# Patient Record
(demographics unavailable — no encounter records)

---

## 2025-01-15 NOTE — ASSESSMENT
[FreeTextEntry1] : The patient is a 49-year-old male with morbid obesity who is status post right leg endovenous laser ablation of his greater saphenous vein. The patient tolerated the procedure well. Today he presents with new bilateral lower extremity venous stasis ulcers.  Recommend Unna boot therapy bilaterally Follow-up in 1 week

## 2025-01-15 NOTE — HISTORY OF PRESENT ILLNESS
[FreeTextEntry1] : The patient is s right EVLT of his GSV.  the patient presents today for follow-up of his bilateral venous stasis ulcer.  The patient hasnt been seen in the last 2-1/2 years.

## 2025-01-15 NOTE — REASON FOR VISIT
[Follow-Up: _____] : a [unfilled] follow-up visit [FreeTextEntry1] : varicose veins s/p right EVLT of the GSV new venous stasis ulcer

## 2025-01-15 NOTE — PHYSICAL EXAM
[Ankle Swelling (On Exam)] : present [Varicose Veins Of Lower Extremities] : bilaterally [Ankle Swelling On The Left] : moderate [] : bilaterally [Ankle Swelling Bilaterally] : severe [FreeTextEntry1] : Right anterior tibial stasis ulcer 1 cm anterior tibial posterior calf 2 cm.  Left anterior tibial ulcer 2 cm circumferential no evidence of cellulitis

## 2025-01-22 NOTE — PHYSICAL EXAM
[Ankle Swelling (On Exam)] : present [Varicose Veins Of Lower Extremities] : bilaterally [Ankle Swelling On The Left] : moderate [] : bilaterally [Ankle Swelling Bilaterally] : severe [FreeTextEntry1] : Right anterior tibial stasis ulcer 1 cm anterior tibial posterior calf 2 cm.  Left anterior tibial ulcer 2 cm circumferential no evidence of cellulitis no

## 2025-02-10 NOTE — ASSESSMENT
[FreeTextEntry1] : The patient is a 49-year-old male with morbid obesity who is status post right leg endovenous laser ablation of his greater saphenous vein. The patient tolerated the procedure well.  The patient has been receiving bilateral Unna boot therapy for venous stasis ulcers.  The patient's ulcers are improving however he will continue to require bilateral Unna boot therapy.  I will see him back in 1 week's time.

## 2025-02-10 NOTE — PHYSICAL EXAM
[Ankle Swelling (On Exam)] : present [Varicose Veins Of Lower Extremities] : bilaterally [Ankle Swelling On The Left] : moderate [] : bilaterally [Ankle Swelling Bilaterally] : severe [FreeTextEntry1] : Right anterior tibial stasis ulcer 1 cm anterior tibial posterior calf 1 cm.  Left anterior tibial ulcer 1 cm circumferential no evidence of cellulitis

## 2025-02-10 NOTE — REVIEW OF SYSTEMS
[Negative] : Heme/Lymph Modified Advancement Flap Text: The defect edges were debeveled with a #15 scalpel blade.  Given the location of the defect, shape of the defect and the proximity to free margins a modified advancement flap was deemed most appropriate.  Using a sterile surgical marker, an appropriate advancement flap was drawn incorporating the defect and placing the expected incisions within the relaxed skin tension lines where possible.    The area thus outlined was incised deep to adipose tissue with a #15 scalpel blade.  The skin margins were undermined to an appropriate distance in all directions utilizing iris scissors.

## 2025-02-25 NOTE — REASON FOR VISIT
[Follow-Up: _____] : a [unfilled] follow-up visit [FreeTextEntry1] : Bilateral lower extremity venous stasis ulcers

## 2025-02-25 NOTE — HISTORY OF PRESENT ILLNESS
[FreeTextEntry1] : The patient is s right EVLT of his GSV.  the patient presents today for follow-up of his bilateral venous stasis ulcer.  The patient hasnt been seen in the last 2-1/2 years.  The patient has been receiving bilateral Unna boot therapy.  His ulcers are overall improving.

## 2025-03-26 NOTE — ASSESSMENT
[FreeTextEntry1] : The patient is a 49-year-old male with morbid obesity who is status post right leg endovenous laser ablation of his greater saphenous vein. The patient tolerated the procedure well.  The patient has been receiving bilateral Unna boot therapy for venous stasis ulcers.  The patient's left leg ulcer is completely healed.  The patient will benefit from 1 more week of right leg Unna boot therapy.  I recommend clobetasol cream to his left lower extremity daily to treat his topical dermatitis.  Follow-up in 1 week

## 2025-03-26 NOTE — PHYSICAL EXAM
[Ankle Swelling (On Exam)] : present [Varicose Veins Of Lower Extremities] : bilaterally [Ankle Swelling On The Left] : moderate [] : present [Ankle Swelling Bilaterally] : severe [FreeTextEntry1] : Right anterior tibial stasis ulcer 1 cm anterior tibial posterior calf 1 cm.  Left anterior tibial ulcer 1 cm circumferential no evidence of cellulitis

## 2025-04-02 NOTE — ASSESSMENT
[FreeTextEntry1] : The patient is a 49-year-old male with morbid obesity who is status post right leg endovenous laser ablation of his greater saphenous vein. The patient tolerated the procedure well.  The patient has been receiving bilateral Unna boot therapy for venous stasis ulcers.  The patient now has recurrent bilateral lower extremity venous stasis ulcers.  I recommend continuing with Unna boot therapy once again and I will see him back in my office in 1 week's time

## 2025-04-02 NOTE — HISTORY OF PRESENT ILLNESS
[FreeTextEntry1] : The patient is s right EVLT of his GSV.  the patient presents today for follow-up of his bilateral venous stasis ulcer.  The patient hasn't been seen in the last 2-1/2 years.   The patient has been receiving bilateral Unna boot therapy.  His venous stasis ulcers had healed and now he developed a new venous stasis ulcers.

## 2025-04-28 NOTE — HISTORY OF PRESENT ILLNESS
[FreeTextEntry1] : The patient is a 49-year-old male who presents for bilateral lower extremity new venous stasis ulcers.  The patient's wounds had healed and he was converted to compression stocking therapy last week.  The patient has been using the donning device to assist him in getting the stockings on however the device created wounds in his legs.  Now he presents with bilateral lower extremity weeping wounds.

## 2025-04-28 NOTE — PHYSICAL EXAM
[FreeTextEntry1] : Right anterior tibial 1 cm x 1 cm left anterior tibial 1 cm x 1 cm in size superficial wounds

## 2025-04-28 NOTE — ASSESSMENT
[FreeTextEntry1] : I recommend replacing his Unna boots bilaterally for 1 week to heal his superficial ulcerations.  I will see him back in my office in 1 week's time continue with Unna boot therapy.

## 2025-04-29 NOTE — HISTORY OF PRESENT ILLNESS
[FreeTextEntry1] : The patient is a 49-year-old male history of bilateral lower extremity venous stasis ulcers.  Today presents with healed wounds.

## 2025-04-29 NOTE — ASSESSMENT
[FreeTextEntry1] : The patient has healed venous stasis ulcers I recommend converting to compression stocking therapy.  I help the patient put on his compression stockings today and showed him the donning device.  I recommend he use this device to aid in ease of placing the stockings on.  I will see him back in my office as needed.

## 2025-05-06 NOTE — PHYSICAL EXAM
[FreeTextEntry1] : Right leg lateral venous stasis ulcer 1 x 1 cm left leg lateral venous stasis ulcer 1 x 1 cm

## 2025-05-06 NOTE — ASSESSMENT
[FreeTextEntry1] : The patient developed recurrent bilateral lower extremity venous stasis ulcers.  I recommend continuing with Unna boot therapy and follow-up in 1 week

## 2025-05-06 NOTE — HISTORY OF PRESENT ILLNESS
[FreeTextEntry1] : The patient is a 49-year-old male history of bilateral lower extremity venous stasis ulcers.  The patient developed recurrent bilateral lower extremity venous stasis ulcers

## 2025-05-14 NOTE — PHYSICAL EXAM
[Ankle Swelling (On Exam)] : present [Ankle Swelling On The Right] : of the right ankle [Ankle Swelling On The Left] : moderate [FreeTextEntry1] : Large right leg varicosities present significant venous insufficiency and venous stasis skin changes present healed superficial wounds. Left leg positive varicose veins present swelling is improved venous stasis ulcer is healed  CEAP C5

## 2025-05-14 NOTE — DATA REVIEWED
[FreeTextEntry1] : Venous duplex right lower extremity Limited study secondary to patient body habitus and severe edema.  History of EVLT.  The common femoral vein is patent and compressible the greater saphenous vein is reopened incompetent with some chronic changes.  There is refill time is greater than 3.9 seconds at the mid thigh.  The diameter at the saphenofemoral junction is 14 mm and at the mid thigh is 10 mm there are large incompetent varicosities with residual wall thrombus seen in the mid thigh extending into the anterior lateral and medial aspect of the thigh and calf with largest diameter 11 mm to 7.5 mm

## 2025-05-14 NOTE — HISTORY OF PRESENT ILLNESS
[FreeTextEntry1] : The patient presents for follow-up evaluation.  The patient has a history of bilateral lower extremity venous stasis ulcer and this and has been receiving bilateral lower extremity Unna boot therapy.  The patient has a history of chronic venous stasis ulcers with closure and reopening.  He has a history of right leg endovenous laser ablation in 2021.  Today the patient presents with healed venous stasis ulcers however he is at risk for reopening.

## 2025-05-27 NOTE — ASSESSMENT
[FreeTextEntry1] : With a boot placed to the right lower extremity.  Continue with compression stocking therapy to the left lower extremity.  I will see the patient back in my office in 1 week's time or sooner if any new symptoms develop.

## 2025-05-27 NOTE — HISTORY OF PRESENT ILLNESS
[FreeTextEntry1] : The patient is a 49-year-old male with a history of bilateral lower extremity venous stasis ulcers.  The patient is currently wearing a left leg compression stocking however he developed a new right leg venous stasis wounds.

## 2025-06-11 NOTE — REASON FOR VISIT
[Follow-Up: _____] : a [unfilled] follow-up visit [FreeTextEntry1] : Status post right leg venous stasis ulcer

## 2025-06-11 NOTE — ASSESSMENT
[FreeTextEntry1] : The patient's right leg venous stasis wound is healed.  I recommend converting to compression stocking therapy 2030 mmHg compression to be 1 daily.  The patient is scheduled for a micro stab phlebectomy of his tributary veins.  I will see the patient back in my office after his stab phlebectomy surgery or sooner if any new symptoms develop.

## 2025-06-11 NOTE — HISTORY OF PRESENT ILLNESS
[FreeTextEntry1] : The patient is a 49-year-old male who has been receiving weekly Unna boot therapy for right leg anterior tibial venous stasis ulcers.  Today the patient presents and his wounds are healed.

## 2025-07-25 NOTE — DISCUSSION/SUMMARY
[FreeTextEntry1] : 48 y/o M with obesity, venous insufficiency, venous ulcers, and GSV incompetency, underwent ligation of R GSV on 6/30/25.  Surgical incisions are healed.  Advised compression therapy. F/u as needed.   I, Dr. Armando, personally performed the evaluation and management (E/M) services for this established patient who presents today with (a) new problem(s)/exacerbation of (an) existing condition(s). That E/M includes conducting the clinically appropriate interval history &/or exam, assessing all new/exacerbated conditions, and establishing a new plan of care. Today, my FLO, Amelie], was here to observe my evaluation and management service for this new problem/exacerbated condition and follow the plan of care established by me going forward.  Thank you for allowing me to participate in the care of your patient.   Sincerely,   Segundo Armando MD, RPVI, FACS Associate Professor of Surgery , Vascular Fellowship Director of Limb Salvage Surgery [facility] [employer] at [facility]
